# Patient Record
Sex: MALE | Race: WHITE | NOT HISPANIC OR LATINO | URBAN - METROPOLITAN AREA
[De-identification: names, ages, dates, MRNs, and addresses within clinical notes are randomized per-mention and may not be internally consistent; named-entity substitution may affect disease eponyms.]

---

## 2020-09-12 ENCOUNTER — APPOINTMENT (OUTPATIENT)
Dept: ULTRASOUND IMAGING | Facility: HOSPITAL | Age: 30
End: 2020-09-12

## 2020-09-12 ENCOUNTER — HOSPITAL ENCOUNTER (EMERGENCY)
Facility: HOSPITAL | Age: 30
Discharge: HOME | End: 2020-09-12
Attending: EMERGENCY MEDICINE | Admitting: EMERGENCY MEDICINE

## 2020-09-12 VITALS
WEIGHT: 215 LBS | OXYGEN SATURATION: 97 % | HEART RATE: 72 BPM | HEIGHT: 73 IN | SYSTOLIC BLOOD PRESSURE: 146 MMHG | BODY MASS INDEX: 28.49 KG/M2 | RESPIRATION RATE: 12 BRPM | DIASTOLIC BLOOD PRESSURE: 93 MMHG | TEMPERATURE: 98 F

## 2020-09-12 DIAGNOSIS — N50.811 TESTICULAR PAIN, RIGHT: Primary | ICD-10-CM

## 2020-09-12 DIAGNOSIS — N50.89 TESTICULAR MASS: ICD-10-CM

## 2020-09-12 LAB
BILIRUB UR QL STRIP: NEGATIVE
CLARITY UR: CLEAR
COLOR UR: YELLOW
GLUCOSE UR STRIP-MCNC: NEGATIVE MG/DL
HGB UR QL STRIP.AUTO: NEGATIVE
KETONES UR QL STRIP: NEGATIVE
LEUKOCYTE ESTERASE UR QL STRIP.AUTO: NEGATIVE
NITRITE UR QL STRIP: NEGATIVE
PH UR STRIP.AUTO: 6 [PH] (ref 5–8)
PROT UR QL STRIP: NEGATIVE
SP GR UR STRIP: 1.02 (ref 1–1.03)
UROBILINOGEN UR QL STRIP: NORMAL

## 2020-09-12 PROCEDURE — 96372 THER/PROPH/DIAG INJ SC/IM: CPT

## 2020-09-12 PROCEDURE — 76870 US EXAM SCROTUM: CPT

## 2020-09-12 PROCEDURE — 81003 URINALYSIS AUTO W/O SCOPE: CPT | Performed by: EMERGENCY MEDICINE

## 2020-09-12 PROCEDURE — 25010000002 KETOROLAC TROMETHAMINE PER 15 MG: Performed by: EMERGENCY MEDICINE

## 2020-09-12 PROCEDURE — 99283 EMERGENCY DEPT VISIT LOW MDM: CPT

## 2020-09-12 PROCEDURE — 93976 VASCULAR STUDY: CPT

## 2020-09-12 PROCEDURE — 87491 CHLMYD TRACH DNA AMP PROBE: CPT | Performed by: EMERGENCY MEDICINE

## 2020-09-12 PROCEDURE — 87591 N.GONORRHOEAE DNA AMP PROB: CPT | Performed by: EMERGENCY MEDICINE

## 2020-09-12 RX ORDER — HYDROCODONE BITARTRATE AND ACETAMINOPHEN 5; 325 MG/1; MG/1
1 TABLET ORAL ONCE
Status: COMPLETED | OUTPATIENT
Start: 2020-09-12 | End: 2020-09-12

## 2020-09-12 RX ORDER — HYDROCODONE BITARTRATE AND ACETAMINOPHEN 5; 325 MG/1; MG/1
1 TABLET ORAL EVERY 6 HOURS PRN
Qty: 12 TABLET | Refills: 0 | Status: SHIPPED | OUTPATIENT
Start: 2020-09-12

## 2020-09-12 RX ORDER — KETOROLAC TROMETHAMINE 30 MG/ML
30 INJECTION, SOLUTION INTRAMUSCULAR; INTRAVENOUS ONCE
Status: COMPLETED | OUTPATIENT
Start: 2020-09-12 | End: 2020-09-12

## 2020-09-12 RX ORDER — KETOROLAC TROMETHAMINE 15 MG/ML
15 INJECTION, SOLUTION INTRAMUSCULAR; INTRAVENOUS ONCE
Status: DISCONTINUED | OUTPATIENT
Start: 2020-09-12 | End: 2020-09-12

## 2020-09-12 RX ORDER — DEXTROAMPHETAMINE SACCHARATE, AMPHETAMINE ASPARTATE, DEXTROAMPHETAMINE SULFATE AND AMPHETAMINE SULFATE 3.75; 3.75; 3.75; 3.75 MG/1; MG/1; MG/1; MG/1
15 TABLET ORAL DAILY
COMMUNITY

## 2020-09-12 RX ADMIN — KETOROLAC TROMETHAMINE 30 MG: 30 INJECTION, SOLUTION INTRAMUSCULAR at 20:30

## 2020-09-12 RX ADMIN — HYDROCODONE BITARTRATE AND ACETAMINOPHEN 1 TABLET: 5; 325 TABLET ORAL at 19:05

## 2020-09-12 NOTE — ED TRIAGE NOTES
Right groin pain more severe throughout the day. Pt reports swelling in right testicle. Mask placed on patient in first look triage. Triage staff wearing masks.

## 2020-09-12 NOTE — ED PROVIDER NOTES
EMERGENCY DEPARTMENT ENCOUNTER    Room Number:  30/30  Date of encounter:  9/12/2020  PCP: Provider, No Known  Historian: Patient      HPI:  Chief Complaint: Right testicular pain  A complete HPI/ROS/PMH/PSH/SH/FH are unobtainable due to: None    Context: Osei Subramanian is a 30 y.o. male who presents to the ED complaining of acute right testicular pain and swelling that started earlier today while at a wedding, has gradually worsened throughout the day with increased swelling.  Able to urinate with some mild discomfort.  Denies any hematuria.  Denies any fall or injury to the area.  No abdominal pain, no nausea or vomiting.      MEDICAL RECORD REVIEW    No known medical allergies on chart review in epic    PAST MEDICAL HISTORY  Active Ambulatory Problems     Diagnosis Date Noted   • No Active Ambulatory Problems     Resolved Ambulatory Problems     Diagnosis Date Noted   • No Resolved Ambulatory Problems     Past Medical History:   Diagnosis Date   • ADHD          PAST SURGICAL HISTORY  Past Surgical History:   Procedure Laterality Date   • DENTAL PROCEDURE      implant requiring bone graft   • MASTECTOMY Right          FAMILY HISTORY  History reviewed. No pertinent family history.      SOCIAL HISTORY  Social History     Socioeconomic History   • Marital status:      Spouse name: Not on file   • Number of children: Not on file   • Years of education: Not on file   • Highest education level: Not on file   Tobacco Use   • Smoking status: Light Tobacco Smoker     Types: Cigarettes   • Smokeless tobacco: Never Used   • Tobacco comment: twice a year   Substance and Sexual Activity   • Alcohol use: Yes     Comment: Rarely   • Drug use: No   • Sexual activity: Defer         ALLERGIES  Patient has no known allergies.        REVIEW OF SYSTEMS  Review of Systems     All systems reviewed and negative except for those discussed in HPI.       PHYSICAL EXAM    I have reviewed the triage vital signs and nursing  notes.    ED Triage Vitals   Temp Heart Rate Resp BP SpO2   09/12/20 1832 09/12/20 1831 09/12/20 1831 -- 09/12/20 1831   98 °F (36.7 °C) 63 18  97 %      Temp src Heart Rate Source Patient Position BP Location FiO2 (%)   09/12/20 1832 09/12/20 1831 -- -- --   Tympanic Monitor          Physical Exam  General: Awake, alert, no acute distress  HEENT: EOMI  Pulm: Symmetric chest rise, nonlabored breathing  CV: Regular rate and rhythm  GI: Non-distended, non-tender  : Some mild edema to the right aspect of the scrotum more significant approximately from the testicle with some tenderness over the testicle itself.  Intact hemispheric reflexes bilaterally.  No erythema.  No obvious inguinal hernia.  MSK: No deformity  Skin: Warm, dry  Neuro: Alert and oriented x 3, moving all extremities, no focal deficits  Psych: Calm, cooperative    Vital signs and nursing notes reviewed.       Surgical mask, protective eye goggles, and gloves used during this encounter. Patient in surgical mask.      LAB RESULTS  Recent Results (from the past 24 hour(s))   Urinalysis With Microscopic If Indicated (No Culture) - Urine, Clean Catch    Collection Time: 09/12/20  7:06 PM    Specimen: Urine, Clean Catch   Result Value Ref Range    Color, UA Yellow Yellow, Straw    Appearance, UA Clear Clear    pH, UA 6.0 5.0 - 8.0    Specific Gravity, UA 1.025 1.005 - 1.030    Glucose, UA Negative Negative    Ketones, UA Negative Negative    Bilirubin, UA Negative Negative    Blood, UA Negative Negative    Protein, UA Negative Negative    Leuk Esterase, UA Negative Negative    Nitrite, UA Negative Negative    Urobilinogen, UA 0.2 E.U./dL 0.2 - 1.0 E.U./dL       Ordered the above labs and independently reviewed the results.        RADIOLOGY  Us Scrotum & Testicles    Result Date: 9/12/2020  TESTICULAR ULTRASOUND INCLUDING DOPPLER VASCULAR EVALUATION  HISTORY: Recent onset of right scrotal pain and swelling.  The examination was performed as an emergency  procedure. There is marked abnormality of the right testicle which is enlarged and measures up to 2.8 x 3.7 x 4.7 cm. The right testis contains 2 heterogeneous mass-like areas with one area being more echogenic and less hypervascular and measuring up to 1.7 x 1.9 x 1.6 cm. The second area is also quite heterogeneous and shows considerable increased vascularity and measures 1.9 x 1.9 x 1.9 cm. The findings are concerning for either extensive orchitis and areas of infection or more likely tumor and surgical consultation is recommended.  The left testicle is normal in size, measuring 2.1 x 2.2 x 3 cm. It contains numerous microcalcifications with some slight heterogeneity. The Doppler vascular evaluation is unremarkable.  There is no epididymal enlargement. There are are no hydroceles.  CONCLUSION: Marked abnormality of the right testis with areas of mass-like heterogeneity and variable vascularity within the right testicle potentially related to extensive infection or more likely related to neoplasm and surgical consultation is recommended. There is also a somewhat heterogeneous appearance of the left testicle which contains numerous microcalcifications and involvement with neoplasm cannot be excluded.  This report was finalized on 9/12/2020 8:17 PM by Dr. Rashid Ortega M.D.        I ordered the above noted radiological studies. Reviewed by me.  See dictation for official radiology interpretation.      PROCEDURES    Procedures      MEDICATIONS GIVEN IN ER    Medications   HYDROcodone-acetaminophen (NORCO) 5-325 MG per tablet 1 tablet (1 tablet Oral Given 9/12/20 1905)   ketorolac (TORADOL) injection 30 mg (30 mg Intramuscular Given 9/12/20 2030)         PROGRESS, DATA ANALYSIS, CONSULTS, AND MEDICAL DECISION MAKING    All labs have been independently reviewed by me.  All radiology studies have been reviewed by me and discussed with radiologist dictating the report.   EKG's independently viewed and interpreted by me.   Discussion below represents my analysis of pertinent findings related to patient's condition, differential diagnosis, treatment plan and final disposition.        ED Course as of Sep 12 2046   Sat Sep 12, 2020   2004 Discussed findings with Dr. Florence, Urology, and he does not feel at this point that this represents torsion and will follow-up in the clinic early next week.  Will add Toradol for increased pain control.    [DC]   2028 Discussed the findings with the patient and his fiancée, they do understand the concern for possible underlying malignancy.  They both report sexual monogamy, I will withhold antibiotics for now until the GC testing returns.  Have given strict instructions for follow-up with urology this week, will prescribe short course of Norco for pain control, have advised anti-inflammatories as well for pain control.  ED return for worsening symptoms as needed.    [DC]      ED Course User Index  [DC] Stevie Chavez MD       AS OF 20:46 EDT VITALS:    BP - 146/93  HR - 72  TEMP - 98 °F (36.7 °C) (Tympanic)  02 SATS - 97%        DIAGNOSIS  Final diagnoses:   Testicular pain, right   Testicular mass         DISPOSITION  DISCHARGE    Patient discharged in stable condition.    Reviewed implications of results, diagnosis, meds, responsibility to follow up, warning signs and symptoms of possible worsening, potential complications and reasons to return to ER.    Patient/Family voiced understanding of above instructions.    Discussed plan for discharge, as there is no emergent indication for admission. Patient referred to primary care provider for BP management due to today's BP. Pt/family is agreeable and understands need for follow up and repeat testing.  Pt is aware that discharge does not mean that nothing is wrong but it indicates no emergency is present that requires admission and they must continue care with follow-up as given below or physician of their choice.     FOLLOW-UP  [facility]  [facility]  [address]  [facility] [address] [phone]    As needed, If symptoms worsen    [facility]  [address] [address]  [facility] [address]  [phone]  Call on 9/14/2020      [facility] [place] [address] [address] [facility] [address] [phone]             Medication List      New Prescriptions    HYDROcodone-acetaminophen 5-325 MG per tablet  Commonly known as: NORCO  Take 1 tablet by mouth Every 6 (Six) Hours As Needed for Severe Pain . Do not drive or mix with alcohol           Where to Get Your Medications      You can get these medications from any pharmacy    Bring a paper prescription for each of these medications  · HYDROcodone-acetaminophen 5-325 MG per tablet                    Stevie Chavez MD  09/12/20 2257

## 2020-09-13 NOTE — DISCHARGE INSTRUCTIONS
Follow-up with urology as discussed, take medications as prescribed, you may add ibuprofen or Aleve for additional pain support if needed, drink plenty fluids, emergency department return for worsening symptoms as needed.  The provided information is for informational purposes only, you have not been definitively diagnosed with cancer, however, based on findings today there is a concern that potentially testicular cancer could be the cause.  
AS PER HPI, otherwise:  Constitutional: no fever, no headache, no lightheadedness, no dizziness  Eyes: no conjunctivitis, no vision changes  Ears: no ear pain   Nose: no nasal congestion, Mouth/Throat: no throat pain, Neck: no stiffness  Cardiovascular: no chest pain, no palpitations  Chest: no cough, no shortness of breath  Gastrointestinal: see hpi  MSK: no joint pain, no swelling of extremities  : no dysuria  Skin: no rash  Neuro: no LOC, no focal weakness, no sensory changes

## 2020-09-17 LAB
C TRACH RRNA SPEC DONR QL NAA+PROBE: NEGATIVE
N GONORRHOEA DNA SPEC QL NAA+PROBE: NEGATIVE

## 2021-11-23 ENCOUNTER — LAB REQUISITION (OUTPATIENT)
Dept: LAB | Facility: HOSPITAL | Age: 31
End: 2021-11-23

## 2021-11-23 DIAGNOSIS — C62.12 MALIGNANT NEOPLASM OF DESCENDED LEFT TESTIS (HCC): ICD-10-CM

## 2021-11-23 DIAGNOSIS — N50.9 DISORDER OF MALE GENITAL ORGANS, UNSPECIFIED: ICD-10-CM

## 2021-11-23 PROCEDURE — 88309 TISSUE EXAM BY PATHOLOGIST: CPT | Performed by: UROLOGY

## 2021-11-24 LAB
LAB AP CASE REPORT: NORMAL
LAB AP SYNOPTIC CHECKLIST: NORMAL
PATH REPORT.FINAL DX SPEC: NORMAL
PATH REPORT.GROSS SPEC: NORMAL